# Patient Record
Sex: MALE | Race: BLACK OR AFRICAN AMERICAN | Employment: FULL TIME | URBAN - METROPOLITAN AREA
[De-identification: names, ages, dates, MRNs, and addresses within clinical notes are randomized per-mention and may not be internally consistent; named-entity substitution may affect disease eponyms.]

---

## 2023-08-30 ENCOUNTER — HOSPITAL ENCOUNTER (EMERGENCY)
Age: 43
Discharge: HOME | End: 2023-08-30
Payer: COMMERCIAL

## 2023-08-30 VITALS
BODY MASS INDEX: 27.73 KG/M2 | RESPIRATION RATE: 16 BRPM | SYSTOLIC BLOOD PRESSURE: 122 MMHG | DIASTOLIC BLOOD PRESSURE: 80 MMHG | WEIGHT: 216.05 LBS | OXYGEN SATURATION: 99 % | HEIGHT: 74 IN | TEMPERATURE: 98 F | HEART RATE: 76 BPM

## 2023-08-30 DIAGNOSIS — S86.811A STRAIN OF RIGHT CALF MUSCLE: Primary | ICD-10-CM

## 2023-08-30 PROCEDURE — 6370000000 HC RX 637 (ALT 250 FOR IP): Performed by: PHYSICIAN ASSISTANT

## 2023-08-30 PROCEDURE — 99284 EMERGENCY DEPT VISIT MOD MDM: CPT

## 2023-08-30 PROCEDURE — 93971 EXTREMITY STUDY: CPT

## 2023-08-30 RX ORDER — NAPROXEN 500 MG/1
500 TABLET ORAL 2 TIMES DAILY PRN
Qty: 30 TABLET | Refills: 0 | Status: SHIPPED | OUTPATIENT
Start: 2023-08-30

## 2023-08-30 RX ORDER — NAPROXEN 250 MG/1
500 TABLET ORAL ONCE
Status: COMPLETED | OUTPATIENT
Start: 2023-08-30 | End: 2023-08-30

## 2023-08-30 RX ADMIN — NAPROXEN SODIUM 500 MG: 250 TABLET ORAL at 10:18

## 2023-08-30 ASSESSMENT — PAIN DESCRIPTION - LOCATION: LOCATION: LEG

## 2023-08-30 ASSESSMENT — LIFESTYLE VARIABLES
HOW MANY STANDARD DRINKS CONTAINING ALCOHOL DO YOU HAVE ON A TYPICAL DAY: PATIENT DOES NOT DRINK
HOW OFTEN DO YOU HAVE A DRINK CONTAINING ALCOHOL: NEVER

## 2023-08-30 ASSESSMENT — PAIN SCALES - GENERAL
PAINLEVEL_OUTOF10: 6

## 2023-08-30 ASSESSMENT — PAIN - FUNCTIONAL ASSESSMENT: PAIN_FUNCTIONAL_ASSESSMENT: 0-10

## 2023-08-30 ASSESSMENT — PAIN DESCRIPTION - DESCRIPTORS: DESCRIPTORS: TIGHTNESS

## 2023-08-30 ASSESSMENT — PAIN DESCRIPTION - ORIENTATION: ORIENTATION: RIGHT

## 2023-08-30 ASSESSMENT — PAIN DESCRIPTION - PAIN TYPE: TYPE: ACUTE PAIN

## 2023-08-30 NOTE — ED TRIAGE NOTES
Pt into ER from home with c/c Right calf pain onset 6 days ago while walking at work. Pt describes pain as tightness. Pt walks a lot for work.

## 2023-08-30 NOTE — ED PROVIDER NOTES
[address]\"" [address] [address]        Pt Name: Tha Mills  MRN: [id number]  [address] [facility] [place] 1980  Date of evaluation: 8/30/2023  Provider: EDWARD Oliva  PCP: No primary care provider on file. Note Started: 10:15 AM EDT 8/30/23      DANYELLE. I have evaluated this patient. CHIEF COMPLAINT       Chief Complaint   Patient presents with    Leg Pain     Right calf pain onset 6 days ago while walking at work. Pt describes pain as tightness. HISTORY OF PRESENT ILLNESS: 1 or more Elements     History From: patient    Tha Mills is a 44 y.o. male who presents right calf pain that started 6 days ago while walking while working. He is a  for the [employer]. He has a walking route. 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 He went to [employer] that day. Reports they gave him ibuprofen. No imaging done. He has been unable to get into his doctor to follow-up. His doctor was also concern for DVT. Patient has no history of VTE. Denies chest pain shortness of breath. Denies fevers or chills. Nursing Notes were reviewed and agreed with or any disagreements were addressed in the HPI. REVIEW OF SYSTEMS :      Review of Systems    Positives and Pertinent negatives as per HPI. SURGICAL HISTORY   History reviewed. No pertinent surgical history. [facility]       Discharge Medication List as of 8/30/2023 11:57 AM          ALLERGIES     Patient has no known allergies. FAMILYHISTORY     History reviewed. No pertinent family history.      SOCIAL HISTORY       Social History     Tobacco Use    Smoking status: Some Days     Types: Cigarettes    Smokeless tobacco: Never   Substance Use Topics    Alcohol use: Never    Drug use: Never       SCREENINGS        Shanique Coma Scale  Eye Opening: Spontaneous  Best Verbal Response: Oriented  Best Motor Response: Obeys

## 2023-08-31 ENCOUNTER — OFFICE VISIT (OUTPATIENT)
Dept: ORTHOPEDIC SURGERY | Age: 43
End: 2023-08-31

## 2023-08-31 VITALS — WEIGHT: 216 LBS | BODY MASS INDEX: 27.72 KG/M2 | HEIGHT: 74 IN

## 2023-08-31 DIAGNOSIS — S86.811A STRAIN OF CALF MUSCLE, RIGHT, INITIAL ENCOUNTER: Primary | ICD-10-CM

## 2023-08-31 DIAGNOSIS — S96.819A: ICD-10-CM

## 2023-09-07 ENCOUNTER — TELEPHONE (OUTPATIENT)
Dept: ORTHOPEDIC SURGERY | Age: 43
End: 2023-09-07

## 2023-09-07 ENCOUNTER — OFFICE VISIT (OUTPATIENT)
Dept: ORTHOPEDIC SURGERY | Age: 43
End: 2023-09-07

## 2023-09-07 VITALS — BODY MASS INDEX: 27.72 KG/M2 | WEIGHT: 216 LBS | HEIGHT: 74 IN

## 2023-09-07 DIAGNOSIS — S86.811A STRAIN OF CALF MUSCLE, RIGHT, INITIAL ENCOUNTER: Primary | ICD-10-CM

## 2023-09-07 DIAGNOSIS — S96.819A: ICD-10-CM

## 2023-09-07 RX ORDER — IBUPROFEN 600 MG/1
600 TABLET ORAL 2 TIMES DAILY WITH MEALS
Qty: 60 TABLET | Refills: 0 | Status: SHIPPED | OUTPATIENT
Start: 2023-09-07

## 2023-09-07 NOTE — TELEPHONE ENCOUNTER
Faxing Patient [address] Name: DOL  Contact Name: Hill Whitten Number: [phone]  Fax Number: [phone]       Patient Request completion of DOL Form CA16.

## 2023-09-20 ENCOUNTER — TELEPHONE (OUTPATIENT)
Dept: ORTHOPEDIC SURGERY | Age: 43
End: 2023-09-20

## 2023-09-20 NOTE — TELEPHONE ENCOUNTER
General Question     Subject: SWELLING  Patient and /or Facility Request: Tran Frances  Contact Number: [phone]    STATED THAT HE HAS HAD SOME SWELLING AND NUMBNESS. WOULD LIKE TO KNOW IF HE NEEDS TO BE SEEN OR JUST ADVISED.

## 2023-09-20 NOTE — TELEPHONE ENCOUNTER
Per Dr Nathalie Arrieta, he may keep his appt on 10/10/23. Should elevate, ice, and take Ibuprofen. I spoke to pt and let him know.

## 2023-09-22 ENCOUNTER — TELEPHONE (OUTPATIENT)
Dept: ORTHOPEDIC SURGERY | Age: 43
End: 2023-09-22

## 2023-09-22 NOTE — TELEPHONE ENCOUNTER
Other CA17  Requested      IW needs CA17 completed for DOS 09.07.2023 office visit and faxed to DOL with his RTW dates & applicable restrictions if any.     Please advise

## 2023-10-10 ENCOUNTER — OFFICE VISIT (OUTPATIENT)
Dept: ORTHOPEDIC SURGERY | Age: 43
End: 2023-10-10

## 2023-10-10 VITALS — WEIGHT: 216 LBS | BODY MASS INDEX: 27.72 KG/M2 | HEIGHT: 74 IN

## 2023-10-10 DIAGNOSIS — S96.819A: ICD-10-CM

## 2023-10-10 DIAGNOSIS — S86.811A STRAIN OF CALF MUSCLE, RIGHT, INITIAL ENCOUNTER: Primary | ICD-10-CM

## 2023-10-13 ENCOUNTER — TELEPHONE (OUTPATIENT)
Dept: ORTHOPEDIC SURGERY | Age: 43
End: 2023-10-13

## 2023-10-13 NOTE — TELEPHONE ENCOUNTER
General Question     Subject: WORK NOTE/SLIP  Patient and /or Facility Request: Mirtha Pfeiffer  Contact Number: [phone]       IW REQ WORK NOTE/SLIP ADVISING IW BE ABLE TO WEAR \"ATHLETIC SHOES\" during work hours, DUE TO LEG/CALF PAIN.       PLEASE ADVISE & CONTACT IW

## 2023-10-13 NOTE — TELEPHONE ENCOUNTER
I spoke to pt. Will ask Dr Silverio Raman to advise on how long pt should wear athletic shoes, Tuesday, when he is back in office. Patient was released to full-duty 10/10/23.

## 2023-10-17 NOTE — TELEPHONE ENCOUNTER
Per Dr Taras Rudolph, may wear athletic shoes for one more month, then may return to regular work shoes/foot wear. I spoke to pt and let him know. He will  a letter at our .